# Patient Record
Sex: FEMALE | Race: WHITE | NOT HISPANIC OR LATINO | Employment: FULL TIME | URBAN - METROPOLITAN AREA
[De-identification: names, ages, dates, MRNs, and addresses within clinical notes are randomized per-mention and may not be internally consistent; named-entity substitution may affect disease eponyms.]

---

## 2022-10-21 ENCOUNTER — OFFICE VISIT (OUTPATIENT)
Dept: URGENT CARE | Facility: MEDICAL CENTER | Age: 42
End: 2022-10-21
Payer: COMMERCIAL

## 2022-10-21 VITALS
WEIGHT: 176 LBS | HEART RATE: 68 BPM | BODY MASS INDEX: 28.28 KG/M2 | SYSTOLIC BLOOD PRESSURE: 132 MMHG | HEIGHT: 66 IN | TEMPERATURE: 97.8 F | DIASTOLIC BLOOD PRESSURE: 82 MMHG

## 2022-10-21 DIAGNOSIS — G43.009 MIGRAINE WITHOUT AURA AND WITHOUT STATUS MIGRAINOSUS, NOT INTRACTABLE: Primary | ICD-10-CM

## 2022-10-21 PROCEDURE — 99213 OFFICE O/P EST LOW 20 MIN: CPT | Performed by: PHYSICIAN ASSISTANT

## 2022-10-21 RX ORDER — KETOROLAC TROMETHAMINE 30 MG/ML
60 INJECTION, SOLUTION INTRAMUSCULAR; INTRAVENOUS ONCE
Status: COMPLETED | OUTPATIENT
Start: 2022-10-21 | End: 2022-10-21

## 2022-10-21 RX ADMIN — KETOROLAC TROMETHAMINE 60 MG: 30 INJECTION, SOLUTION INTRAMUSCULAR; INTRAVENOUS at 13:30

## 2022-10-21 NOTE — PROGRESS NOTES
[facility]        NAME: Miriam Canela is a 38 y o  female  : 1980    MRN: [id number]  DATE: 2022  TIME: 1:20 PM    Assessment and Plan   Migraine without aura and without status migrainosus, not intractable [G43 009]  1  Migraine without aura and without status migrainosus, not intractable  ketorolac (TORADOL) injection 60 mg         Patient Instructions       Follow up with PCP p sebastián n  Chief Complaint     Chief Complaint   Patient presents with   • Migraine     Patient stated she has had a migraine behind her right eye for three days she has tried over the counter meds which has not even her any relief  At home covid test this  morning was negative  History of Present Illness       Patient with 3 day history of her usual type headache behind her right eye  There is some associated photophobia  She has tried OTC meds without success  Migraine  This is a new problem  The problem occurs constantly  The problem is unchanged  The pain is present in the right unilateral  The pain does not radiate  The pain quality is similar to prior headaches  The quality of the pain is described as aching  The pain is mild  Associated symptoms include eye pain and photophobia  Pertinent negatives include no blurred vision, dizziness, eye redness, eye watering, facial sweating, nausea, phonophobia, rhinorrhea, seizures, sinus pressure, sore throat, tinnitus, visual change or vomiting  Review of Systems   Review of Systems   HENT: Negative for rhinorrhea, sinus pressure, sore throat and tinnitus  Eyes: Positive for photophobia and pain  Negative for blurred vision and redness  Gastrointestinal: Negative for nausea and vomiting  Neurological: Negative for dizziness and seizures  All other systems reviewed and are negative  Current Medications     No current outpatient medications on file      Current Facility-Administered Medications:   •  ketorolac (TORADOL) injection 60 mg, 60 mg, Intramuscular, Once, Paris Gonzales PA-C    Current Allergies     Allergies as of 10/21/2022   • (No Known Allergies)            The following portions of the patient's history were reviewed and updated as appropriate: allergies, current medications, past family history, past medical history, past social history, past surgical history and problem list      History reviewed  No pertinent past medical history  History reviewed  No pertinent surgical history  History reviewed  No pertinent family history  Medications have been verified  Objective   /82   Pulse 68   Temp 97 8 °F (36 6 °C)   Ht 5' 6" (1 676 m)   Wt 79 8 kg (176 lb)   BMI 28 41 kg/m²   No LMP recorded  Physical Exam     Physical Exam  Vitals and nursing note reviewed  Eyes:      Extraocular Movements: Extraocular movements intact  Pupils: Pupils are equal, round, and reactive to light  Cardiovascular:      Rate and Rhythm: Normal rate and regular rhythm  Pulses: Normal pulses  Heart sounds: Normal heart sounds  Pulmonary:      Effort: Pulmonary effort is normal       Breath sounds: Normal breath sounds  Neurological:      General: No focal deficit present  Mental Status: She is oriented to person, place, and time  Cranial Nerves: No cranial nerve deficit